# Patient Record
Sex: MALE | Race: WHITE | NOT HISPANIC OR LATINO | URBAN - NONMETROPOLITAN AREA
[De-identification: names, ages, dates, MRNs, and addresses within clinical notes are randomized per-mention and may not be internally consistent; named-entity substitution may affect disease eponyms.]

---

## 2023-03-03 NOTE — PROGRESS NOTES
"  Assessment & Plan     Opioid use disorder  Nirav is here to establish care.  He has been sober now for 3 years on Suboxone.  He takes 112 mg film under his tongue daily.  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  His life he states is so much better.  He has gained nearly 100 pounds \"\" in the last year.  For that reason you really need to get him back and get some lab work done to check his hep C status which he will do again in a month.  He is with his significant other who is also been sober the same amount of time  - SUBOXONE 12-3 MG FILM per film; Place 1 Film under the tongue daily    Establishing care with new doctor, encounter for  We will be happy to accept Nirav in our practice.  He has not officially established but we have seen him on and off with his partner  1. Opioid use disorder       Dental disease  All of next teeth are really severely decayed.  This is from his long-term heroin and other drug use.  At this point were going to make referral to have them all removed.      Ordering of each unique test  Prescription drug management  15 minutes spent on the date of the encounter doing chart review, history and exam, documentation and further activities per the note       See Patient Instructions    No follow-ups on file.    ZOHAIB Baer  [facility] - NEFTALISAHARA Grullon is a 32 year old, presenting for the following health issues:  Establish Care      HPI   Establish care   For Suboxone and general health. .     Concern - dental disease   Onset: she is going to be given referral. Getting teeth pulled and fixed.   Description: due to drug use has been sober 4 years.   Intensity: moderate  Progression of Symptoms:  improving  Accompanying Signs & Symptoms: facial pain   Previous history of similar problem: yes   Precipitating factors:        Worsened by: nothing   Alleviating factors:        Improved by: nothing   Therapies " tried and outcome:  none         Currently taking 9 mg of Buprenorphine/Naloxone daily.  - getting off the street     Status since last visit:    Since last visit patient has been: doing well.     Intensity:     There has been: no craving.      Suboxone Dose: adequate.      When did you take your last dose? Today  Progression of Symptoms:     Cues to use and relapse None - last use - April 2020    Recovery program has been:  New to us today.  Accompanying Signs & Symptoms:    Side Effects: none.    Sobriety:     Status: no use since last visit.      Drug Screen: patient willing but screen unnecessary.  Lab closed today.  Will update next visit  Precipitating factors:    Triggers have been: people, places.   Alleviating factors:    Contact with sponsor has been: no sponsor.     Family and support system has been: helpful.   Other Therapies Tried :     Patient has been going to recovery meetings:regularly.  Goes to [employer] regularly     Other medical concerns: No    Any ED visits? No     PDMP Reviewed? Yes, 3.6.23, as expected.  No findings.      Would like to get his own script.  History of severe OUD       Review of Systems   CONSTITUTIONAL: NEGATIVE for fever, chills, change in weight  ENT/MOUTH: NEGATIVE for ear, mouth and throat problems  RESP: NEGATIVE for significant cough or SOB  CV: NEGATIVE for chest pain, palpitations or peripheral edema      Objective    /65 (BP Location: Left arm, Patient Position: Sitting, Cuff Size: Adult Large)   Pulse 81   Temp 97.8  F (36.6  C) (Tympanic)   Resp 18   Wt 103.4 kg (228 lb)   SpO2 91%   There is no height or weight on file to calculate BMI.  Physical Exam   GENERAL: healthy, alert and no distress  EYES: Eyes grossly normal to inspection, PERRL and conjunctivae and sclerae normal  HENT: ear canals and TM's normal, nose and mouth without ulcers or lesions  NECK: no adenopathy, no asymmetry, masses, or scars and thyroid normal to palpation  RESP: lungs clear to  auscultation - no rales, rhonchi or wheezes  CV: regular rate and rhythm, normal S1 S2, no S3 or S4, no murmur, click or rub, no peripheral edema and peripheral pulses strong  ABDOMEN: soft, nontender, no hepatosplenomegaly, no masses and bowel sounds normal  MS: no gross musculoskeletal defects noted, no edema    No results found for any previous visit.

## 2023-03-06 ENCOUNTER — OFFICE VISIT (OUTPATIENT)
Dept: FAMILY MEDICINE | Facility: OTHER | Age: 33
End: 2023-03-06
Attending: PHYSICIAN ASSISTANT
Payer: COMMERCIAL

## 2023-03-06 ENCOUNTER — PATIENT OUTREACH (OUTPATIENT)
Dept: CARE COORDINATION | Facility: OTHER | Age: 33
End: 2023-03-06

## 2023-03-06 VITALS
DIASTOLIC BLOOD PRESSURE: 65 MMHG | TEMPERATURE: 97.8 F | HEART RATE: 81 BPM | WEIGHT: 228 LBS | OXYGEN SATURATION: 91 % | RESPIRATION RATE: 18 BRPM | SYSTOLIC BLOOD PRESSURE: 115 MMHG

## 2023-03-06 DIAGNOSIS — K08.9 DENTAL DISEASE: ICD-10-CM

## 2023-03-06 DIAGNOSIS — F11.90 OPIOID USE DISORDER: Primary | ICD-10-CM

## 2023-03-06 DIAGNOSIS — Z76.89 ESTABLISHING CARE WITH NEW DOCTOR, ENCOUNTER FOR: ICD-10-CM

## 2023-03-06 PROCEDURE — G0463 HOSPITAL OUTPT CLINIC VISIT: HCPCS

## 2023-03-06 PROCEDURE — 99203 OFFICE O/P NEW LOW 30 MIN: CPT | Performed by: PHYSICIAN ASSISTANT

## 2023-03-06 RX ORDER — BUPRENORPHINE HYDROCHLORIDE, NALOXONE HYDROCHLORIDE 12; 3 MG/1; MG/1
1 FILM, SOLUBLE BUCCAL; SUBLINGUAL DAILY
Qty: 30 EACH | Refills: 1 | Status: SHIPPED | OUTPATIENT
Start: 2023-03-06 | End: 2023-06-08

## 2023-03-06 SDOH — ECONOMIC STABILITY: FOOD INSECURITY: WITHIN THE PAST 12 MONTHS, YOU WORRIED THAT YOUR FOOD WOULD RUN OUT BEFORE YOU GOT MONEY TO BUY MORE.: NEVER TRUE

## 2023-03-06 SDOH — ECONOMIC STABILITY: FOOD INSECURITY: WITHIN THE PAST 12 MONTHS, THE FOOD YOU BOUGHT JUST DIDN'T LAST AND YOU DIDN'T HAVE MONEY TO GET MORE.: NEVER TRUE

## 2023-03-06 SDOH — ECONOMIC STABILITY: TRANSPORTATION INSECURITY
IN THE PAST 12 MONTHS, HAS LACK OF TRANSPORTATION KEPT YOU FROM MEETINGS, WORK, OR FROM GETTING THINGS NEEDED FOR DAILY LIVING?: NO

## 2023-03-06 SDOH — ECONOMIC STABILITY: TRANSPORTATION INSECURITY
IN THE PAST 12 MONTHS, HAS THE LACK OF TRANSPORTATION KEPT YOU FROM MEDICAL APPOINTMENTS OR FROM GETTING MEDICATIONS?: NO

## 2023-03-06 SDOH — ECONOMIC STABILITY: INCOME INSECURITY: IN THE LAST 12 MONTHS, WAS THERE A TIME WHEN YOU WERE NOT ABLE TO PAY THE MORTGAGE OR RENT ON TIME?: NO

## 2023-03-06 ASSESSMENT — ANXIETY QUESTIONNAIRES
4. TROUBLE RELAXING: NOT AT ALL
6. BECOMING EASILY ANNOYED OR IRRITABLE: MORE THAN HALF THE DAYS
3. WORRYING TOO MUCH ABOUT DIFFERENT THINGS: NOT AT ALL
GAD7 TOTAL SCORE: 5
IF YOU CHECKED OFF ANY PROBLEMS ON THIS QUESTIONNAIRE, HOW DIFFICULT HAVE THESE PROBLEMS MADE IT FOR YOU TO DO YOUR WORK, TAKE CARE OF THINGS AT HOME, OR GET ALONG WITH OTHER PEOPLE: NOT DIFFICULT AT ALL
GAD7 TOTAL SCORE: 5
2. NOT BEING ABLE TO STOP OR CONTROL WORRYING: SEVERAL DAYS
7. FEELING AFRAID AS IF SOMETHING AWFUL MIGHT HAPPEN: NOT AT ALL
1. FEELING NERVOUS, ANXIOUS, OR ON EDGE: MORE THAN HALF THE DAYS
5. BEING SO RESTLESS THAT IT IS HARD TO SIT STILL: NOT AT ALL

## 2023-03-06 ASSESSMENT — PATIENT HEALTH QUESTIONNAIRE - PHQ9: SUM OF ALL RESPONSES TO PHQ QUESTIONS 1-9: 10

## 2023-03-06 ASSESSMENT — PAIN SCALES - GENERAL: PAINLEVEL: NO PAIN (0)

## 2023-03-06 ASSESSMENT — SOCIAL DETERMINANTS OF HEALTH (SDOH): HOW HARD IS IT FOR YOU TO PAY FOR THE VERY BASICS LIKE FOOD, HOUSING, MEDICAL CARE, AND HEATING?: NOT HARD AT ALL

## 2023-03-06 ASSESSMENT — ACTIVITIES OF DAILY LIVING (ADL): DEPENDENT_IADLS:: INDEPENDENT

## 2023-03-06 NOTE — PROGRESS NOTES
Clinic Care Coordination Contact    Clinic Care Coordination Contact  OUTREACH    Referral Information:  Referral Source: Self-patient/Caregiver    Primary Diagnosis: CD    Chief Complaint   Patient presents with     Clinic Care Coordination - Face To Face        Universal Utilization: Nirav new to us today.  Has been taking Suboxone off the street for the last 3 years.  Would like to est care and get his own prescription    Clinic Utilization  Difficulty keeping appointments:: No  Compliance Concerns: No  No-Show Concerns: No  No PCP office visit in Past Year: No  Utilization    Hospital Admissions  0             ED Visits  0             No Show Count (past year)  0                Current as of: 3/4/2023  1:05 AM              Clinical Concerns:  Current Medical Concerns:  None    Current Behavioral Concerns: None    Education Provided to patient: Suboxone program went over in detail with pt.  Informed consent and treatment agreement signed.  Electronic communication form signed.  Narcan education provided.  Dicussed risks/benefits.  Risks of benzos/ETOH use discussed.       Pain  Pain (GOAL):: No  Health Maintenance Reviewed: Due/Overdue Est care this date - Will continue to work on this  Clinical Pathway: None    Medication Management:  Medication review status: Medications reviewed.  Changes noted per patient report.       Functional Status:  Dependent ADLs:: Independent  Dependent IADLs:: Independent  Bed or wheelchair confined:: No  Mobility Status: Independent    Living Situation:  Current living arrangement:: I live in a private home with spouse  Type of residence:: Apartment    Lifestyle & Psychosocial Needs:    Social Determinants of Health     Tobacco Use: Low Risk      Smoking Tobacco Use: Never     Smokeless Tobacco Use: Never     Passive Exposure: Not on file   Alcohol Use: Not on file   Financial Resource Strain: Low Risk      Difficulty of Paying Living Expenses: Not hard at all   Food Insecurity: No  Food Insecurity     Worried About Running Out of Food in the Last Year: Never true     Ran Out of Food in the Last Year: Never true   Transportation Needs: No Transportation Needs     Lack of Transportation (Medical): No     Lack of Transportation (Non-Medical): No   Physical Activity: Not on file   Stress: Not on file   Social Connections: Not on file   Intimate Partner Violence: Not on file   Depression: Not at risk     PHQ-2 Score: 2   Housing Stability: Low Risk      Unable to Pay for Housing in the Last Year: No     Number of Places Lived in the Last Year: 1     Unstable Housing in the Last Year: No     Diet:: Regular  Inadequate nutrition (GOAL):: No  Tube Feeding: No  Inadequate activity/exercise (GOAL):: No  Significant changes in sleep pattern (GOAL): No  Transportation means:: Regular car     Evangelical or spiritual beliefs that impact treatment:: No  Mental health DX:: No  Mental health management concern (GOAL):: No  Chemical Dependency Status: Past Concern  Informal Support system:: Children, Spouse, Family           Resources and Interventions:  Current Resources:      Community Resources: None  Supplies Currently Used at Home: None  Equipment Currently Used at Home: none  Employment Status: employed full-time       Referrals Placed: None    The patient consented via Written consent to have contact information and resources sent via text in an unencrypted manner.     Care Plan:      Patient/Caregiver understanding: Yes    Outreach Frequency: monthly  Future Appointments              In 1 month Charlotte Chaudhary, PA [facility] - Alesia Small          Plan: Will start Suboxone 9-12mg daily.  Follow up 2 months - will update lab work at next visit.  He is agreeable with this.  Reach out sooner with concerns.      Did have him sign electronic communication form and informed consent and treatment agreement this date.  Sent down to scanning.      Laura Peralta RN-BSN, [facility]  Coordinator  [phone] opt. #3

## 2023-03-06 NOTE — LETTER
Informed  Consent  and  Agreement  for  Buprenorphine/Naloxone Treatment  Of Opioid Dependence   Mitchel Rosalesll  [phone]         March 6, 2023        Buprenorphine/naloxone can control symptoms of withdrawal and can decrease cravings for other opioids,  We recommend not trying to stop this medication on your own, most people who stop on their own have a relapse of their drug use.      This  agreement  provides important information  on the potential benefits and  risks of opioid medications and shows that both  you and your provider agree on a care plan so  that opioid medications are used in a way that is  safe and effective in treating your withdrawal symptoms and opioid dependence.  This agreement is reviewed and signed by all patients in our practice who  receive Buprenorphine/Naloxone (Suboxone/Subutex).   There are other treatment options for opioid use disorder, such as Methadone and Vivitrol, which were discussed this date.          Expected  Benefits or  Goals of Buprenorphine Treatment     Decreased craving     Improved  ability to engage  in work, social, recreational  and/or physical activities     Improved  quality of life           Potential Risks or Side Effects of Buprenorphine Treatment    Overdose Taking more than the prescribed amount of medication or using with alcohol or other drugs can cause you to stop breathing, resulting in coma, brain damage, or even death.    Risk of overdose to other family members, children are at high risk of being harmed by buprenorphine    Physical side effects May include mood changes, drowsiness, nausea, constipation, urination difficulties, depressed breathing, itching, bone thinning and sexual difficulties, such as lowering male hormone in men and stopping menstrual periods in women.    Withdrawal Buprenorphine/naloxone can bring on severe withdrawal if taken with other opioids.   Physical dependence Suddenly stopping buprenorphine may lead to withdrawal  symptoms including abdominal cramping, pain, diarrhea, sweating, anxiety, irritability and aching.   Hyperalgesia The use of opioids can increase the experience of pain. If this happens, it may require change or discontinuation of medication.    Sleep apnea  Sleep apnea (periods of not breathing while asleep) may be caused or worsened by opioids.   Risk to unborn child Risks to unborn children may include: physical dependence at birth, possible alterations in pain perception, possible increased risk for addiction later in life, among others. Tell your provider if you are or intend to become pregnant.  Do NOT stop buprenorphine if you become pregnant without discussing with your provider.    Victimization There is a risk that you or someone in your household may be the victim of theft, deceit, assault or abuse by people trying to take your medications to misuse them.         Responsibilities  in Buprenorphine/naloxone Therapy  of Opioid Addiction   Your provider's responsibilities:   Listening carefully to your concerns, treating you with care and with due respect, and making clinical decisions based on what he/she believes is in your best interest.    Your responsibilities:   In order to maximize the potential benefits of buprenorphine and to minimize the potential risks, it is important that you accept the following responsibilities.    We are a primary care clinic, not an addiction clinic.    In signing this agreement, you agree to:  1. Use your buprenorphine medications as prescribed for the purpose of treating opioid addiction/opioid use disorder.  2. BE HONEST! We will work with you to help with your opioid dependence if you are upfront with us.  3. Be on time for appointments. Frequent late or missed appointments will result in termination from our program.  4. Be respectful and considerate to all staff and providers at our clinic.  Rude or aggressive behavior to staff including providers, nursing staff, front  desk and any others will result in termination from the program.  5. Avoid excessive alcohol or other dangerous recreational drug use, especially Benzodiazepines (like Xanax/alprozolam, Ativan/lorazepam, Valium/diazepam) while being prescribed buprenorphine. Evidence of this occurring may result in referral for consultation or higher level of care and possible termination from our program.  6. Not share, sell, trade or in any way provide your medications to others.  Evidence of this occurring may result in termination from our buprenorphine program.  7. Not receive controlled substances from other clinics without discussion with your buprenorphine physician.  Evidence of this occurring may result in termination from our buprenorphine program.  8. Starting or increasing dosage may result in difficulty concentrating; so we would recommend having a  to bring you to appointments where this may happen   9. If opioids are prescribed unexpectedly by another office (for example due to an accident or dental procedure), inform this office within 24 hours and bring documentation of the visit.  10. Have urine/blood drug tests on a random basis and as requested by your provider. Be ready for this at each appointment.  11. Bring your medication to the clinic when requested.  12. Store your medication in a secure location away from children.  13. Participate in other chemical dependency treatments or other forms of care agreed to with your provider.  You are expected to keep these appointments and follow your care plan. Buprenorphine alone is not enough to overcome addiction/dependence; you are required to have a care plan beyond just Buprenorphine.  14. Permit this practice to communicate with other care providers and/or your significant  others as needed to assure buprenorphine is being used appropriately and is beneficial to  your health and well-being.  15.  Plan ahead for refills. You are responsible for planning your  appointments to get refills on time. If you cannot schedule with your primary provider, it is your responsibility to schedule with another buprenorphine provider.  If you are restricted to just on provider then arrangements must be made in advance if you need refills when your provider is not available.  16. No phone refills - you must be seen in clinic by one of our physicians that prescribe buprenorphine.  17. Agree to purchase all buprenorphine/naloxone, and any other medications related to my treatment from one pharmacy.   18. No early refills. If you lose your meds, run out early, or have your meds stolen, we will not give early refills. We CAN, however, help with symptoms of withdrawal. Call us if you have any trouble, and we will try to help. We recommend a system to help lock up meds.  19.  I understand that my clinic can track controlled substance prescriptions from other providers through a central database (prescription monitoring program).  20  To tell my clinic right away if I become pregnant or have a new medical problem treated at another clinic.    Your medications may be continued if they assist you in maintaining sobriety, help you engage in valued activities, and/or enhance your quality of life and you follow the above responsibilities.  Your medications may be discontinued if your goals for treatment are not met, if you experience negative effects from using them, or if you do not follow this agreement.    I have reviewed this document and have been given the opportunity to have any questions  answered. I understand the possible benefits and risks of buprenorphine medications and I accept the  responsibilities described above.    __________________________________        ____________________________________  Patient     Date          ZOHAIB Baer                     Date

## 2023-04-28 ENCOUNTER — PATIENT OUTREACH (OUTPATIENT)
Dept: CARE COORDINATION | Facility: OTHER | Age: 33
End: 2023-04-28

## 2023-04-28 NOTE — PROGRESS NOTES
Clinic Care Coordination Contact  Care Team Conversations    CC sent Nirav a text message this date reminding him of his appointment on Monday, May 1, and to arrive at 4:15PM.  Informed him that he will have blood work completed at this time as well.      Laura Peralta RN-BSN, [facility] Care Coordinator  [phone] opt. #3

## 2023-05-01 ENCOUNTER — PATIENT OUTREACH (OUTPATIENT)
Dept: CARE COORDINATION | Facility: OTHER | Age: 33
End: 2023-05-01

## 2023-05-01 NOTE — PROGRESS NOTES
Clinic Care Coordination Contact  Care Team Conversations    Nirav was a no show for his appointment this date with Charlotte Chaudhary.  CC sent him a text message.  Will wait to hear back to get him rescheduled.    Laura Peralta RN-BSN, [facility] Care Coordinator  [phone] opt. #3

## 2023-06-05 DIAGNOSIS — F11.90 OPIOID USE DISORDER: ICD-10-CM

## 2023-06-06 ENCOUNTER — PATIENT OUTREACH (OUTPATIENT)
Dept: CARE COORDINATION | Facility: OTHER | Age: 33
End: 2023-06-06

## 2023-06-06 NOTE — PROGRESS NOTES
Clinic Care Coordination Contact  Care Team Conversations    CC sent Nirav a text message this date requesting that he let CC know when he is available to come in for an appointment with Charlotte Chaudhary.  Nirav missed his last appointment.  Will wait to hear back.    Laura Peralta RN-BSN, [facility] Care Coordinator  [phone] opt. #3

## 2023-06-08 NOTE — TELEPHONE ENCOUNTER
Suboxone      Last Written Prescription Date:  4.24.23  Last Fill Quantity: #30,   # refills: 0  Last Office Visit: 3.6.23  Future Office visit:       Routing refill request to provider for review/approval because:  Drug not on the G, P or [facility] refill protocol or controlled substance

## 2023-06-09 RX ORDER — BUPRENORPHINE HYDROCHLORIDE, NALOXONE HYDROCHLORIDE 12; 3 MG/1; MG/1
1 FILM, SOLUBLE BUCCAL; SUBLINGUAL DAILY
Qty: 20 EACH | Refills: 0 | Status: SHIPPED | OUTPATIENT
Start: 2023-06-09 | End: 2023-07-03

## 2023-07-03 DIAGNOSIS — F11.90 OPIOID USE DISORDER: ICD-10-CM

## 2023-07-03 RX ORDER — BUPRENORPHINE HYDROCHLORIDE, NALOXONE HYDROCHLORIDE 12; 3 MG/1; MG/1
1 FILM, SOLUBLE BUCCAL; SUBLINGUAL DAILY
Qty: 30 EACH | Refills: 0 | Status: SHIPPED | OUTPATIENT
Start: 2023-07-03 | End: 2023-07-12

## 2023-07-06 RX ORDER — BUPRENORPHINE HYDROCHLORIDE, NALOXONE HYDROCHLORIDE 12; 3 MG/1; MG/1
1 FILM, SOLUBLE BUCCAL; SUBLINGUAL DAILY
Refills: 0 | OUTPATIENT
Start: 2023-07-06

## 2023-07-11 ENCOUNTER — PATIENT OUTREACH (OUTPATIENT)
Dept: CARE COORDINATION | Facility: OTHER | Age: 33
End: 2023-07-11

## 2023-07-11 NOTE — PROGRESS NOTES
Assessment & Plan     Opioid use disorder  Refill is given . His labs need to be done. He is sober as well as his wife. Working in a [employer] community. This seems to help her.  See us back as recommended.    - Basic metabolic panel  - CBC with platelets  - Hepatic function panel  - Hepatitis B Surface Antibody  - Hepatitis B surface antigen  - Hepatitis C Screen Reflex to HCV RNA Quant and Genotype  - HIV Antigen Antibody Combo Cascade  - Urine Drugs of Abuse Screen Panel 13  - SUBOXONE 12-3 MG FILM per film; Place 1 Film under the tongue daily    Review of external notes as documented elsewhere in note  Ordering of each unique test  Prescription drug management  10  minutes spent by me on the date of the encounter doing chart review, history and exam, documentation and further activities per the note       See Patient Instructions    No follow-ups on file.    ZOHAIB Baer  [facility] - NEFTALISAHARA Grullon is a 32 year old, presenting for the following health issues:  Recheck Medication         No data to display              HPI       He is currently taking 12/3 mg of buprenorphine daily.   Last fill 7.8.23 #30.     Status Since Last Visit:    Have you used any opioids since your last visit?: no use since last visit    Do you feel that your dose of suboxone is too high or too low? Adequate    Have there been cravings for opioids? No     Any withdrawal symptoms? None      Any side effects from the medication? None    Any alcohol use? None    Any other recreational drug use? THC      Precipitating Factors:    Triggers have been: non-existent   Other Supports:    Do you attend counseling or meet with a therapist? No    Do you attend NA or AA meetings? No    Do you have/meet with a sponsor? No    Family and support systems have been: helpful    What other goals have you been working on (job, family, relationships, etc)? Still working at [employer].  Enjoying it.  Traveling a lot            3/6/2023     4:00 PM   PHQ-9 SCORE   PHQ-9 Total Score 10         3/6/2023     4:00 PM   AYSE-7 SCORE   Total Score 5     PDMP Review     None                  Review of Systems   Constitutional, HEENT, cardiovascular, pulmonary, gi and gu systems are negative, except as otherwise noted.      Objective    /70 (BP Location: Left arm, Patient Position: Sitting, Cuff Size: Adult Large)   Pulse 84   Temp 98.4  F (36.9  C) (Tympanic)   Resp 16   Wt 103.4 kg (228 lb)   SpO2 94%   There is no height or weight on file to calculate BMI.  Physical Exam   GENERAL: healthy, alert and no distress  NECK: no adenopathy, no asymmetry, masses, or scars and thyroid normal to palpation  RESP: lungs clear to auscultation - no rales, rhonchi or wheezes  CV: regular rate and rhythm, normal S1 S2, no S3 or S4, no murmur, click or rub, no peripheral edema and peripheral pulses strong  ABDOMEN: soft, nontender, no hepatosplenomegaly, no masses and bowel sounds normal  MS: no gross musculoskeletal defects noted, no edema  PSYCH: mentation appears normal, affect normal/bright    Results for orders placed or performed in visit on 07/12/23 (from the past 24 hour(s))   Basic metabolic panel   Result Value Ref Range    Sodium 140 136 - 145 mmol/L    Potassium 4.0 3.4 - 5.3 mmol/L    Chloride 102 98 - 107 mmol/L    Carbon Dioxide (CO2) 26 22 - 29 mmol/L    Anion Gap 12 7 - 15 mmol/L    Urea Nitrogen 12.5 6.0 - 20.0 mg/dL    Creatinine 0.75 0.67 - 1.17 mg/dL    Calcium 10.2 (H) 8.6 - 10.0 mg/dL    Glucose 97 70 - 99 mg/dL    GFR Estimate >90 >60 mL/min/1.73m2   CBC with platelets   Result Value Ref Range    WBC Count 8.3 4.0 - 11.0 10e3/uL    RBC Count 5.04 4.40 - 5.90 10e6/uL    Hemoglobin 14.0 13.3 - 17.7 g/dL    Hematocrit 43.0 40.0 - 53.0 %    MCV 85 78 - 100 fL    MCH 27.8 26.5 - 33.0 pg    MCHC 32.6 31.5 - 36.5 g/dL    RDW 13.6 10.0 - 15.0 %    Platelet Count 300 150 - 450 10e3/uL   Hepatic function panel   Result Value  Ref Range    Protein Total 8.0 6.4 - 8.3 g/dL    Albumin 4.5 3.5 - 5.2 g/dL    Bilirubin Total 0.2 <=1.2 mg/dL    Alkaline Phosphatase 95 40 - 129 U/L    AST 22 0 - 45 U/L    ALT 26 0 - 70 U/L    Bilirubin Direct <0.20 0.00 - 0.30 mg/dL   Urine Drugs of Abuse Screen Panel 13   Result Value Ref Range    Cannabinoids (74-hzw-9-carboxy-9-THC) Detected (A) Not Detected, Indeterminate    Phencyclidine Not Detected Not Detected, Indeterminate    Cocaine (Benzoylecgonine) Not Detected Not Detected, Indeterminate    Methamphetamine (d-Methamphetamine) Not Detected Not Detected, Indeterminate    Opiates (Morphine) Not Detected Not Detected, Indeterminate    Amphetamine (d-Amphetamine) Not Detected Not Detected, Indeterminate    Benzodiazepines (Nordiazepam) Not Detected Not Detected, Indeterminate    Tricyclic Antidepressants (Desipramine) Not Detected Not Detected, Indeterminate    Methadone Not Detected Not Detected, Indeterminate    Barbiturates (Butalbital) Not Detected Not Detected, Indeterminate    Oxycodone Not Detected Not Detected, Indeterminate    Propoxyphene (Norpropoxyphene) Not Detected Not Detected, Indeterminate    Buprenorphine Detected (A) Not Detected, Indeterminate

## 2023-07-11 NOTE — PROGRESS NOTES
Clinic Care Coordination Contact  Care Team Conversations    CC sent Nirav a text message this date reminding him of his appointment tomorrow and to arrive at 4:15PM.    Laura Peralta RN-BSN, [facility] Care Coordinator  [phone] opt. #3

## 2023-07-12 ENCOUNTER — APPOINTMENT (OUTPATIENT)
Dept: LAB | Facility: OTHER | Age: 33
End: 2023-07-12
Attending: PHYSICIAN ASSISTANT
Payer: COMMERCIAL

## 2023-07-12 ENCOUNTER — PATIENT OUTREACH (OUTPATIENT)
Dept: CARE COORDINATION | Facility: OTHER | Age: 33
End: 2023-07-12

## 2023-07-12 ENCOUNTER — OFFICE VISIT (OUTPATIENT)
Dept: FAMILY MEDICINE | Facility: OTHER | Age: 33
End: 2023-07-12
Attending: PHYSICIAN ASSISTANT
Payer: COMMERCIAL

## 2023-07-12 VITALS
OXYGEN SATURATION: 94 % | DIASTOLIC BLOOD PRESSURE: 70 MMHG | TEMPERATURE: 98.4 F | RESPIRATION RATE: 16 BRPM | WEIGHT: 228 LBS | HEART RATE: 84 BPM | SYSTOLIC BLOOD PRESSURE: 112 MMHG

## 2023-07-12 DIAGNOSIS — F11.90 OPIOID USE DISORDER: Primary | ICD-10-CM

## 2023-07-12 LAB
ALBUMIN SERPL BCG-MCNC: 4.5 G/DL (ref 3.5–5.2)
ALP SERPL-CCNC: 95 U/L (ref 40–129)
ALT SERPL W P-5'-P-CCNC: 26 U/L (ref 0–70)
AMPHETAMINES UR QL: NOT DETECTED
ANION GAP SERPL CALCULATED.3IONS-SCNC: 12 MMOL/L (ref 7–15)
AST SERPL W P-5'-P-CCNC: 22 U/L (ref 0–45)
BARBITURATES UR QL SCN: NOT DETECTED
BENZODIAZ UR QL SCN: NOT DETECTED
BILIRUB DIRECT SERPL-MCNC: <0.2 MG/DL (ref 0–0.3)
BILIRUB SERPL-MCNC: 0.2 MG/DL
BUN SERPL-MCNC: 12.5 MG/DL (ref 6–20)
BUPRENORPHINE UR QL: DETECTED
CALCIUM SERPL-MCNC: 10.2 MG/DL (ref 8.6–10)
CANNABINOIDS UR QL: DETECTED
CHLORIDE SERPL-SCNC: 102 MMOL/L (ref 98–107)
COCAINE UR QL SCN: NOT DETECTED
CREAT SERPL-MCNC: 0.75 MG/DL (ref 0.67–1.17)
D-METHAMPHET UR QL: NOT DETECTED
DEPRECATED HCO3 PLAS-SCNC: 26 MMOL/L (ref 22–29)
ERYTHROCYTE [DISTWIDTH] IN BLOOD BY AUTOMATED COUNT: 13.6 % (ref 10–15)
GFR SERPL CREATININE-BSD FRML MDRD: >90 ML/MIN/1.73M2
GLUCOSE SERPL-MCNC: 97 MG/DL (ref 70–99)
HCT VFR BLD AUTO: 43 % (ref 40–53)
HGB BLD-MCNC: 14 G/DL (ref 13.3–17.7)
MCH RBC QN AUTO: 27.8 PG (ref 26.5–33)
MCHC RBC AUTO-ENTMCNC: 32.6 G/DL (ref 31.5–36.5)
MCV RBC AUTO: 85 FL (ref 78–100)
METHADONE UR QL SCN: NOT DETECTED
OPIATES UR QL SCN: NOT DETECTED
OXYCODONE UR QL SCN: NOT DETECTED
PCP UR QL SCN: NOT DETECTED
PLATELET # BLD AUTO: 300 10E3/UL (ref 150–450)
POTASSIUM SERPL-SCNC: 4 MMOL/L (ref 3.4–5.3)
PROPOXYPH UR QL: NOT DETECTED
PROT SERPL-MCNC: 8 G/DL (ref 6.4–8.3)
RBC # BLD AUTO: 5.04 10E6/UL (ref 4.4–5.9)
SODIUM SERPL-SCNC: 140 MMOL/L (ref 136–145)
TRICYCLICS UR QL SCN: NOT DETECTED
WBC # BLD AUTO: 8.3 10E3/UL (ref 4–11)

## 2023-07-12 PROCEDURE — 82248 BILIRUBIN DIRECT: CPT | Mod: ZL | Performed by: PHYSICIAN ASSISTANT

## 2023-07-12 PROCEDURE — 86803 HEPATITIS C AB TEST: CPT | Mod: ZL | Performed by: PHYSICIAN ASSISTANT

## 2023-07-12 PROCEDURE — 87340 HEPATITIS B SURFACE AG IA: CPT | Mod: ZL | Performed by: PHYSICIAN ASSISTANT

## 2023-07-12 PROCEDURE — 85027 COMPLETE CBC AUTOMATED: CPT | Mod: ZL | Performed by: PHYSICIAN ASSISTANT

## 2023-07-12 PROCEDURE — 86706 HEP B SURFACE ANTIBODY: CPT | Mod: ZL | Performed by: PHYSICIAN ASSISTANT

## 2023-07-12 PROCEDURE — 80306 DRUG TEST PRSMV INSTRMNT: CPT | Mod: ZL | Performed by: PHYSICIAN ASSISTANT

## 2023-07-12 PROCEDURE — 99214 OFFICE O/P EST MOD 30 MIN: CPT | Performed by: PHYSICIAN ASSISTANT

## 2023-07-12 PROCEDURE — G0463 HOSPITAL OUTPT CLINIC VISIT: HCPCS

## 2023-07-12 PROCEDURE — 82310 ASSAY OF CALCIUM: CPT | Mod: ZL | Performed by: PHYSICIAN ASSISTANT

## 2023-07-12 PROCEDURE — 87389 HIV-1 AG W/HIV-1&-2 AB AG IA: CPT | Mod: ZL | Performed by: PHYSICIAN ASSISTANT

## 2023-07-12 PROCEDURE — 36415 COLL VENOUS BLD VENIPUNCTURE: CPT | Mod: ZL | Performed by: PHYSICIAN ASSISTANT

## 2023-07-12 RX ORDER — BUPRENORPHINE HYDROCHLORIDE, NALOXONE HYDROCHLORIDE 12; 3 MG/1; MG/1
1 FILM, SOLUBLE BUCCAL; SUBLINGUAL DAILY
Qty: 30 EACH | Refills: 1 | Status: SHIPPED | OUTPATIENT
Start: 2023-07-12 | End: 2023-10-04

## 2023-07-12 ASSESSMENT — PAIN SCALES - GENERAL: PAINLEVEL: NO PAIN (0)

## 2023-07-12 NOTE — TELEPHONE ENCOUNTER
Suboxone      Last Written Prescription Date:  6.9.23  Last Fill Quantity: #20,   # refills: 0  Last Office Visit: 3.6.23  Future Office visit:    Next 5 appointments (look out 90 days)    Jul 12, 2023  4:30 PM  (Arrive by 4:15 PM)  [facility] [place] [facility] [address]  [place] [address]  [phone]           Routing refill request to provider for review/approval because:  Drug not on the [facility], P or [facility] refill protocol or controlled substance     Yes

## 2023-07-12 NOTE — PROGRESS NOTES
Clinic Care Coordination Contact    Follow Up Progress Note      Assessment: CC attended office visit with pt, pt's spouse, and Charlotte Chaudhary this date.  Nirav is doing very well in recovery.  Suboxone dose is adequate and would like to continue on current dose.  Still working at [employer] - enjoying this.  Has been traveling a lot - spending time with family.  Updated blood work today.  No questions or concerns.      Care Gaps:    Health Maintenance Due   Topic Date Due     NICOTINE/TOBACCO CESSATION COUNSELING Q 1 YR  Never done     ADVANCE CARE PLANNING  Never done     HEPATITIS B IMMUNIZATION (1 of 3 - 3-dose series) Never done     COVID-19 Vaccine (1) Never done     HIV SCREENING  Never done     HEPATITIS C SCREENING  Never done     YEARLY PREVENTIVE VISIT  03/21/2019       Casa continue to work on these    Care Plans      Intervention/Education provided during outreach: Lab work updated today.  Will inform him of results when all results are back.       Outreach Frequency: 3 months      Plan:   No change in treatment plan at this time.  Care Coordinator will follow up in 12 weeks, sooner if he has concerns.    Laura Peralta RN-BSN, [facility] Care Coordinator  [phone] opt. #3

## 2023-07-13 LAB
HBV SURFACE AB SERPL IA-ACNC: 0 M[IU]/ML
HBV SURFACE AB SERPL IA-ACNC: NONREACTIVE M[IU]/ML
HBV SURFACE AG SERPL QL IA: NONREACTIVE
HCV AB SERPL QL IA: NONREACTIVE
HIV 1+2 AB+HIV1 P24 AG SERPL QL IA: NONREACTIVE

## 2023-07-17 NOTE — RESULT ENCOUNTER NOTE
[address] [address]    Phone:  [phone]    Fax:  [phone]       Thank You for choosing us for your health care visit. We are glad to serve you and happy to provide you with this summary of your visit. Please help us to ensure we have accurate records. If you find anything that needs to be changed, please let our staff know as soon as possible.          Your Demographic Information     Patient Name Sex     Pablo Dominguez Male 1952       Ethnic Group Patient Race    Not of  or  Origin White      Your Visit Details     Date & Time Provider Department    3/3/2017 1:45 PM Deejay Webb MD [facility]      Your Upcoming Appointment*(Max 10)     2017  8:00 AM CDT   Complete Physical Exam with Deejay Webb MD   [facility] ([facility])    [address] [place], [place] [address]   [phone]              Conditions Discussed Today or Order-Related Diagnoses        Comments    Erectile dysfunction of organic origin    -  Primary       Your Vitals Were     BP Pulse Height Weight SpO2 BMI    124/68 70 6' 1\" (1.854 m) 188 lb (85.3 kg) 99% 24.8 kg/m2    Smoking Status                   Former Smoker           Medications Prescribed or Re-Ordered Today     sildenafil (VIAGRA) 100 MG tablet    Sig - Route: Take 1 tablet by mouth as needed for Erectile Dysfunction. 1/2 tablet to 1 tablet daily as needed for ED - Oral    Class: Eprescribe    Pharmacy: [facility] #[facility] [address] Ph #: [phone]      Your Current Medications Are        Disp Refills Start End    sildenafil (VIAGRA) 100 MG tablet 3 tablet 0 3/3/2017     Sig - Route: Take 1 tablet by mouth as needed for Erectile Dysfunction. 1/2 tablet to 1 tablet daily as needed for ED - Oral    Class: Eprescribe    naproxen  No hep C, HIV, all other labs are good including liver  sodium (ALEVE) 220 MG tablet        Sig - Route: Take 220 mg by mouth 2 times daily as needed.  - Oral    Class: Historical Med    vitamin - therapeutic multivitamins w/minerals (CENTRUM SILVER,THERA-M) TABS        Sig - Route: Take 1 tablet by mouth daily. - Oral    Class: Historical Med      Allergies     Bactrim RASH      Immunizations History as of 3/3/2017     Name Date    HERPES ZOSTER SHINGLES 3/16/2015    Hepatitis B Adult 5/8/2001, 12/5/2000, 11/1/2000    Td:Adult type tetanus/diphtheria 11/2/1998    Tdap 2/7/2012      Problem List as of 3/3/2017     Routine general medical examination at a health care facility    Plantar fasciitis of right foot    Left knee pain    Bilateral leg edema    Erectile dysfunction of organic origin            Patient Instructions     None

## 2023-10-03 ENCOUNTER — PATIENT OUTREACH (OUTPATIENT)
Dept: CARE COORDINATION | Facility: OTHER | Age: 33
End: 2023-10-03

## 2023-10-03 NOTE — PROGRESS NOTES
Clinic Care Coordination Contact  Care Team Conversations    CC sent Nirav a text message this date reminding him of his appointment tomorrow and to arrive at 3:45PM.    Laura Peralta RN-BSN, [facility] Care Coordinator  [phone] opt. #3

## 2023-10-03 NOTE — PROGRESS NOTES
Assessment & Plan     Opioid use disorder  Staying sober and doing well.   - Urine Drugs of Abuse Screen Buprenorphine Urine Qualitative [id number], Ethanol Urine Qualitative TPO044, Methadone Urine Qualitative JUJ0008, Oxycodone Urine Qualitative RPH7688, Creatinine Urine Random OFM729; No  - SUBOXONE 12-3 MG FILM per film; Place 1 Film under the tongue daily    Prescription drug management  10  minutes spent by me on the date of the encounter doing chart review, history and exam, documentation and further activities per the note       See Patient Instructions    No follow-ups on file.    ZOHAIB Baer  [facility] [place] Grullon is a 32 year old, presenting for the following health issues:  Recheck Medication        10/4/2023     4:09 PM   Additional Questions   Roomed by Zina Farrar   Accompanied by Destiny         10/4/2023     4:09 PM   Patient Reported Additional Medications   Patient reports taking the following new medications none       HPI       He is currently taking 12/3 mg of buprenorphine 1 time daily.   Last fill 9.30.23 #30.    Status Since Last Visit:  Have you used any opioids since your last visit?: no use since last visit  Do you feel that your dose of suboxone is too high or too low? Adequate  Have there been cravings for opioids? No   Any withdrawal symptoms?  None     Any side effects from the medication?  None  Any alcohol use? None  Any other recreational drug use? THC    Precipitating Factors:  Triggers have been: non-existent   Other Supports:  Do you attend counseling or meet with a therapist? No - going to [employer]   Do you attend NA or AA meetings? No  Do you have/meet with a sponsor? No  Family and support systems have been: Helpful  What other goals have you been working on (job, family, relationships, etc)? 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  Has been working on the house  Going to [employer]  Working at [employer]  Spending a lot of time with family            3/6/2023     4:00 PM   PHQ-9 SCORE   PHQ-9 Total Score 10           3/6/2023     4:00 PM   AYSE-7 SCORE   Total Score 5     PDMP Review       None                    Review of Systems   Constitutional, HEENT, cardiovascular, pulmonary, gi and gu systems are negative, except as otherwise noted.      Objective    /71 (BP Location: Left arm, Patient Position: Sitting, Cuff Size: Adult Regular)   Pulse 92   Temp 98.6  F (37  C) (Tympanic)   Wt 98.1 kg (216 lb 4.8 oz)   SpO2 94%   There is no height or weight on file to calculate BMI.  Physical Exam   GENERAL: healthy, alert and no distress  EYES: Eyes grossly normal to inspection, PERRL and conjunctivae and sclerae normal  HENT: ear canals and TM's normal, nose and mouth without ulcers or lesions  NECK: no adenopathy, no asymmetry, masses, or scars and thyroid normal to palpation  RESP: lungs clear to auscultation - no rales, rhonchi or wheezes  CV: regular rate and rhythm, normal S1 S2, no S3 or S4, no murmur, click or rub, no peripheral edema and peripheral pulses strong  ABDOMEN: soft, nontender, no hepatosplenomegaly, no masses and bowel sounds normal  MS: no gross musculoskeletal defects noted, no edema    Results for orders placed or performed in visit on 10/04/23 (from the past 24 hour(s))   Urine Drugs of Abuse Screen Buprenorphine Urine Qualitative [id number], Ethanol Urine Qualitative FDL549, Methadone Urine Qualitative VNM1886, Oxycodone Urine Qualitative FWQ0645, Creatinine Urine Random VAL152; No    Narrative    The following orders were created for panel order Urine Drugs of Abuse Screen Buprenorphine Urine Qualitative [id number], Ethanol Urine Qualitative CBX974, Methadone Urine Qualitative OHV9028, Oxycodone Urine Qualitative EVI0903, Creatinine Urine Random ZEX023; No.  Procedure                               Abnormality         Status                     ---------                               -----------         ------                      Drug Abuse Screen Qual U...[[id number]]  Abnormal            Final result               Buprenorphine Urine, Jean...[[id number]]  Abnormal            Final result               Ethanol urine[[id number]]                Normal              Final result               Methadone Qual Urine[[id number]]         Normal              Final result               Oxycodone Urine, Qualita...[[id number]]  Normal              Final result               Creatinine random urine[[id number]]                          Final result                 Please view results for these tests on the individual orders.   Drug Abuse Screen Qual Urine   Result Value Ref Range    Amphetamines Urine Screen Negative Screen Negative    Barbituates Urine Screen Negative Screen Negative    Benzodiazepine Urine Screen Negative Screen Negative    Cannabinoids Urine Screen Positive (A) Screen Negative    Cocaine Urine Screen Negative Screen Negative    Fentanyl Qual Urine Screen Negative Screen Negative    Opiates Urine Screen Negative Screen Negative    PCP Urine Screen Negative Screen Negative   Buprenorphine Urine, Qualitative   Result Value Ref Range    Buprenorphine Qual Urine Screen Positive (A) Screen Negative   Ethanol urine   Result Value Ref Range    Ethanol Urine Screen Negative Screen Negative   Methadone Qual Urine   Result Value Ref Range    Methadone Urine Screen Negative Screen Negative   Oxycodone Urine, Qualitative   Result Value Ref Range    Oxycodone Urine Screen Negative Screen Negative   Creatinine random urine   Result Value Ref Range    Creatinine Urine mg/dL 185.1 mg/dL

## 2023-10-04 ENCOUNTER — PATIENT OUTREACH (OUTPATIENT)
Dept: CARE COORDINATION | Facility: OTHER | Age: 33
End: 2023-10-04

## 2023-10-04 ENCOUNTER — APPOINTMENT (OUTPATIENT)
Dept: LAB | Facility: OTHER | Age: 33
End: 2023-10-04
Attending: PHYSICIAN ASSISTANT
Payer: COMMERCIAL

## 2023-10-04 ENCOUNTER — OFFICE VISIT (OUTPATIENT)
Dept: FAMILY MEDICINE | Facility: OTHER | Age: 33
End: 2023-10-04
Attending: PHYSICIAN ASSISTANT
Payer: COMMERCIAL

## 2023-10-04 VITALS
HEART RATE: 92 BPM | TEMPERATURE: 98.6 F | DIASTOLIC BLOOD PRESSURE: 71 MMHG | OXYGEN SATURATION: 94 % | SYSTOLIC BLOOD PRESSURE: 128 MMHG | WEIGHT: 216.3 LBS

## 2023-10-04 DIAGNOSIS — F11.90 OPIOID USE DISORDER: Primary | ICD-10-CM

## 2023-10-04 LAB
AMPHETAMINES UR QL SCN: ABNORMAL
BARBITURATES UR QL SCN: ABNORMAL
BENZODIAZ UR QL SCN: ABNORMAL
BUPRENORPHINE UR QL: ABNORMAL
BZE UR QL SCN: ABNORMAL
CANNABINOIDS UR QL SCN: ABNORMAL
CREAT UR-MCNC: 185.1 MG/DL
ETHANOL UR QL SCN: NORMAL
FENTANYL UR QL: ABNORMAL
METHADONE UR QL SCN: NORMAL
OPIATES UR QL SCN: ABNORMAL
OXYCODONE UR QL: NORMAL
PCP QUAL URINE (ROCHE): ABNORMAL

## 2023-10-04 PROCEDURE — 80307 DRUG TEST PRSMV CHEM ANLYZR: CPT | Mod: ZL | Performed by: PHYSICIAN ASSISTANT

## 2023-10-04 PROCEDURE — 82570 ASSAY OF URINE CREATININE: CPT | Mod: ZL,XU | Performed by: PHYSICIAN ASSISTANT

## 2023-10-04 PROCEDURE — G0463 HOSPITAL OUTPT CLINIC VISIT: HCPCS

## 2023-10-04 PROCEDURE — 99213 OFFICE O/P EST LOW 20 MIN: CPT | Performed by: PHYSICIAN ASSISTANT

## 2023-10-04 RX ORDER — KETOCONAZOLE 20 MG/ML
SHAMPOO TOPICAL DAILY PRN
Qty: 240 ML | Refills: 4 | Status: SHIPPED | OUTPATIENT
Start: 2023-10-04

## 2023-10-04 RX ORDER — BUPRENORPHINE HYDROCHLORIDE, NALOXONE HYDROCHLORIDE 12; 3 MG/1; MG/1
1 FILM, SOLUBLE BUCCAL; SUBLINGUAL DAILY
Qty: 30 EACH | Refills: 1 | Status: SHIPPED | OUTPATIENT
Start: 2023-10-04 | End: 2024-01-26

## 2023-10-04 NOTE — PROGRESS NOTES
Clinic Care Coordination Contact  Follow Up Progress Note      Assessment: CC attended office visit with pt, pt's wife, and Charlotte Chaudhary this date.  Nirav is doing very well in recovery.  Suboxone dose is adequate and would like to continue on current dose.  Still working at [employer].  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  Has been busy working on the house.  Continues to go to [employer].  Spending time with family.      Care Gaps:    Health Maintenance Due   Topic Date Due    NICOTINE/TOBACCO CESSATION COUNSELING Q 1 YR  Never done    ADVANCE CARE PLANNING  Never done    HEPATITIS B IMMUNIZATION (1 of 3 - 3-dose series) Never done    COVID-19 Vaccine (1) Never done    YEARLY PREVENTIVE VISIT  03/21/2019    INFLUENZA VACCINE (1) Never done       Will continue to work on these    Care Plans      Intervention/Education provided during outreach: Continue meds as prescribed.  Continue going to [employer].       Outreach Frequency: 3 months        Plan:   No change in treatment plan at this time.      Care Coordinator will follow up in 12 weeks, sooner if he has concerns.    Laura Peralta RN-BSN, [facility] Care Coordinator  [phone] opt. #3

## 2023-12-27 ENCOUNTER — PATIENT OUTREACH (OUTPATIENT)
Dept: CARE COORDINATION | Facility: OTHER | Age: 33
End: 2023-12-27

## 2023-12-27 NOTE — PROGRESS NOTES
Clinic Care Coordination Contact  Care Team Conversations    Charlotte Neena it out this date.  CC sent Nirav a text message informing him of this and that today's appointment will need to be rescheduled.   Rescheduled him for January 24, arrive at 3:15PM.      Laura Peralta RN-BSN, [facility] Care Coordinator  [phone] opt. #3

## 2024-01-23 ENCOUNTER — PATIENT OUTREACH (OUTPATIENT)
Dept: CARE COORDINATION | Facility: OTHER | Age: 34
End: 2024-01-23

## 2024-01-23 NOTE — PROGRESS NOTES
Clinic Care Coordination Contact  Care Team Conversations    CC sent Nirav a text message this date reminding him of his appointment tomorrow and to arrive at 3:15PM.    Laura Peralta RN-BSN, [facility] Care Coordinator  [phone]

## 2024-01-24 ENCOUNTER — PATIENT OUTREACH (OUTPATIENT)
Dept: CARE COORDINATION | Facility: OTHER | Age: 34
End: 2024-01-24

## 2024-01-24 NOTE — PROGRESS NOTES
Clinic Care Coordination Contact  Care Team Conversations    Received message from Nirav stating that he is unable to make it to his appointment today.  Reports he is currently without insurance.  CC encouraged him to call the [facility] to work on getting his insurance reactivated.  He stated that he will do this.  CC requested that he reach out to reschedule when his insurance is active again.  He agreed to this.    Will wait for response.    Laura Peralta RN-BSN, [facility] Care Coordinator  [phone]

## 2024-01-26 DIAGNOSIS — F11.90 OPIOID USE DISORDER: ICD-10-CM

## 2024-01-26 RX ORDER — BUPRENORPHINE HYDROCHLORIDE, NALOXONE HYDROCHLORIDE 12; 3 MG/1; MG/1
1 FILM, SOLUBLE BUCCAL; SUBLINGUAL DAILY
Qty: 30 EACH | Refills: 0 | Status: SHIPPED | OUTPATIENT
Start: 2024-01-26 | End: 2024-03-07

## 2024-01-26 NOTE — TELEPHONE ENCOUNTER
Suboxone    - insurance runs out on 1/31/24 - thought it was already inactive.   Last Written Prescription Date:  12.15.23  Last Fill Quantity: #30,   # refills: 0  Last Office Visit: 10.4.23  Future Office visit:       Routing refill request to provider for review/approval because:  Drug not on the FM, P or [facility] refill protocol or controlled substance

## 2024-03-07 DIAGNOSIS — F11.90 OPIOID USE DISORDER: ICD-10-CM

## 2024-03-07 RX ORDER — BUPRENORPHINE HYDROCHLORIDE, NALOXONE HYDROCHLORIDE 12; 3 MG/1; MG/1
1 FILM, SOLUBLE BUCCAL; SUBLINGUAL DAILY
Qty: 30 EACH | Refills: 0 | Status: SHIPPED | OUTPATIENT
Start: 2024-03-07 | End: 2024-05-02

## 2024-03-07 NOTE — TELEPHONE ENCOUNTER
Suboxone   - waiting to hear [place] from the county - insurance info has been submitted for renewal.   Last Written Prescription Date:  1.26.24  Last Fill Quantity: #30,   # refills: 0  Last Office Visit: 10.4.23  Future Office visit:       Routing refill request to provider for review/approval because:  Drug not on the [facility], P or [facility] refill protocol or controlled substance

## 2024-05-02 DIAGNOSIS — F11.90 OPIOID USE DISORDER: ICD-10-CM

## 2024-05-02 RX ORDER — BUPRENORPHINE AND NALOXONE 12; 3 MG/1; MG/1
1 FILM, SOLUBLE BUCCAL; SUBLINGUAL DAILY
Qty: 30 EACH | Refills: 0 | Status: SHIPPED | OUTPATIENT
Start: 2024-05-02

## 2024-05-02 NOTE — TELEPHONE ENCOUNTER
Suboxone - still working on insurance-  should be active next month      Last Written Prescription Date:  3.27.24  Last Fill Quantity: #30,   # refills: 0  Last Office Visit: 10.4.23  Future Office visit:       Routing refill request to provider for review/approval because:  Drug not on the [facility], [facility] or [facility] refill protocol or controlled substance

## 2024-05-06 RX ORDER — BUPRENORPHINE AND NALOXONE 8; 2 MG/1; MG/1
1.5 FILM, SOLUBLE BUCCAL; SUBLINGUAL DAILY
Qty: 45 EACH | Refills: 0 | Status: SHIPPED | OUTPATIENT
Start: 2024-05-06 | End: 2024-06-04

## 2024-05-06 NOTE — TELEPHONE ENCOUNTER
Noam Pirere doesn't have any 12mg films in stock.  New RX for 8mg films - same dosing.  Please review and sign if appropriate.

## 2024-06-04 DIAGNOSIS — F11.90 OPIOID USE DISORDER: ICD-10-CM

## 2024-06-04 RX ORDER — BUPRENORPHINE AND NALOXONE 8; 2 MG/1; MG/1
1.5 FILM, SOLUBLE BUCCAL; SUBLINGUAL DAILY
Qty: 45 EACH | Refills: 0 | Status: SHIPPED | OUTPATIENT
Start: 2024-06-04 | End: 2024-07-09

## 2024-06-04 NOTE — TELEPHONE ENCOUNTER
buprenorphine HCl-naloxone HCl (SUBOXONE) 8-2 MG per film        Last Written Prescription Date:  5-6-24  Last Fill Quantity: 45,   # refills: 0  Last Office Visit: 10-4-23  Future Office visit:       Routing refill request to provider for review/approval because:  Drug not on the [facility], [facility] or [facility] refill protocol or controlled substance

## 2024-06-04 NOTE — TELEPHONE ENCOUNTER
important  Maximum MME cannot be calculated for this prescription. Enter discrete sig details to calculate maximum MME.

## 2024-07-06 DIAGNOSIS — F11.90 OPIOID USE DISORDER: ICD-10-CM

## 2024-07-09 RX ORDER — BUPRENORPHINE AND NALOXONE 8; 2 MG/1; MG/1
1.5 FILM, SOLUBLE BUCCAL; SUBLINGUAL DAILY
Qty: 45 EACH | Refills: 0 | Status: SHIPPED | OUTPATIENT
Start: 2024-07-09 | End: 2024-08-22

## 2024-07-09 NOTE — TELEPHONE ENCOUNTER
Suboxone      Last Written Prescription Date:  6.8.24  Last Fill Quantity: #45,   # refills: 0  Last Office Visit: 10.4.23  Future Office visit:       Routing refill request to provider for review/approval because:  Drug not on the G, P or [facility] refill protocol or controlled substance

## 2024-07-10 ENCOUNTER — PATIENT OUTREACH (OUTPATIENT)
Dept: CARE COORDINATION | Facility: OTHER | Age: 34
End: 2024-07-10

## 2024-07-10 ASSESSMENT — ACTIVITIES OF DAILY LIVING (ADL): DEPENDENT_IADLS:: INDEPENDENT

## 2024-07-10 NOTE — PROGRESS NOTES
Clinic Care Coordination Contact  Care Team Conversations    RN CC sent Nirav a text message this date checking in with him and to see if he has any updates on her insurance status.  Will wait for response.    Laura Peralta RN-BSN, [facility] Care Coordinator  [phone]

## 2024-08-22 DIAGNOSIS — F11.90 OPIOID USE DISORDER: ICD-10-CM

## 2024-08-22 RX ORDER — BUPRENORPHINE AND NALOXONE 8; 2 MG/1; MG/1
1.5 FILM, SOLUBLE BUCCAL; SUBLINGUAL DAILY
Qty: 45 EACH | Refills: 0 | Status: SHIPPED | OUTPATIENT
Start: 2024-08-22 | End: 2024-10-01

## 2024-08-22 NOTE — TELEPHONE ENCOUNTER
BUPREN/NALOXONE 8MG-2MG FILM       Last Written Prescription Date:  07/09/2024  Last Fill Quantity: 45,   # refills: 0  Last Office Visit: 10/04/2023  Future Office visit:       Routing refill request to provider for review/approval because:    Kimberly Boecker, RN

## 2024-09-25 ENCOUNTER — PATIENT OUTREACH (OUTPATIENT)
Dept: CARE COORDINATION | Facility: OTHER | Age: 34
End: 2024-09-25

## 2024-09-25 NOTE — PROGRESS NOTES
Clinic Care Coordination Contact  Care Team Conversations    JESUS STILL sent Nirav a text message this date checking in with him on his insurance status.  He has been without insurance for several months now - and always reports back that he is working on it.  RN CC informed him that Charlotte Chaudhary will be retiring in March 2025, so we need to see him in clinic to discuss a plan going forward.  Did inform him that there are 5 other providers at our clinic that prescribe MOUD and that RN CC would be more than happy to set him up with a new provider.  Will wait for a response.    Laura Peralta RN-BSN, [facility] Care Coordinator  [phone]

## 2024-10-01 DIAGNOSIS — F11.90 OPIOID USE DISORDER: ICD-10-CM

## 2024-10-01 RX ORDER — BUPRENORPHINE AND NALOXONE 8; 2 MG/1; MG/1
1.5 FILM, SOLUBLE BUCCAL; SUBLINGUAL DAILY
Qty: 45 EACH | Refills: 0 | Status: SHIPPED | OUTPATIENT
Start: 2024-10-01

## 2024-10-01 NOTE — TELEPHONE ENCOUNTER
Suboxone - informed Nirav that we need to see him prior to his next refill.  This will be last fill without being seen      Last Written Prescription Date:  8.22.24  Last Fill Quantity: #45,   # refills: 0  Last Office Visit: 10.4.23  Future Office visit:       Routing refill request to provider for review/approval because:  Drug not on the FM, P or [facility] refill protocol or controlled substance

## 2024-10-25 ENCOUNTER — PATIENT OUTREACH (OUTPATIENT)
Dept: CARE COORDINATION | Facility: OTHER | Age: 34
End: 2024-10-25

## 2024-10-25 NOTE — PROGRESS NOTES
Clinic Care Coordination Contact  Care Team Conversations    RN CC sent Nirav a text message this date checking in with him - informing him that he needs to schedule an appointment before his Suboxone can be refilled.  RN CC will wait to hear from him.    Laura Peralta RN-BSN, [facility] Care Coordinator  [phone]

## 2024-11-14 DIAGNOSIS — F11.90 OPIOID USE DISORDER: ICD-10-CM

## 2024-11-14 RX ORDER — BUPRENORPHINE AND NALOXONE 8; 2 MG/1; MG/1
1.5 FILM, SOLUBLE BUCCAL; SUBLINGUAL DAILY
Qty: 45 EACH | Refills: 0 | Status: SHIPPED | OUTPATIENT
Start: 2024-11-14

## 2024-11-14 RX ORDER — KETOCONAZOLE 20 MG/ML
SHAMPOO, SUSPENSION TOPICAL DAILY PRN
Qty: 240 ML | Refills: 1 | Status: SHIPPED | OUTPATIENT
Start: 2024-11-14

## 2024-11-14 NOTE — TELEPHONE ENCOUNTER
Suboxone     - appt scheduled for Nirav- Friday, January 10, 2025, arrive at 12:45PM.  Informed him that he needs to attend this appointment in order for his meds to continue to be refilled, as Charlotte is retiring.  Stated understanding.    Last Written Prescription Date:  10.12.24  Last Fill Quantity: #45,   # refills: 0  Last Office Visit: 10.4.23  Future Office visit:       Routing refill request to provider for review/approval because:  Drug not on the [facility], P or [facility] refill protocol or controlled substance

## 2025-01-02 DIAGNOSIS — F11.90 OPIOID USE DISORDER: ICD-10-CM

## 2025-01-02 RX ORDER — BUPRENORPHINE AND NALOXONE 8; 2 MG/1; MG/1
1.5 FILM, SOLUBLE BUCCAL; SUBLINGUAL DAILY
Qty: 45 FILM | Refills: 0 | Status: SHIPPED | OUTPATIENT
Start: 2025-01-02

## 2025-01-02 NOTE — TELEPHONE ENCOUNTER
BUPREN/NALOXONE 8MG-2MG FILM       Last Written Prescription Date:  11/14/2024  Last Fill Quantity: 45,   # refills: 0  Last Office Visit: 10/04/2024  Future Office visit:       Routing refill request to provider for review/approval because:      Kimberly Boecker, RN

## 2025-01-09 ENCOUNTER — PATIENT OUTREACH (OUTPATIENT)
Dept: CARE COORDINATION | Facility: OTHER | Age: 35
End: 2025-01-09

## 2025-01-09 NOTE — PROGRESS NOTES
Clinic Care Coordination Contact  Care Team Conversations    RN CC sent Nirav a text message this date reminding him of his appointment tomorrow and to arrive at 12:45PM.    Laura Peralta RN-BSN, [facility] Care Coordinator  [phone]

## 2025-01-09 NOTE — PROGRESS NOTES
"  Assessment & Plan     Opioid use disorder  He is not using. Discussion on use of tablets instead of films due to cost. Pretty significant changes. He owns a home  and very reliable and showing up for work.   - Urine Drug Screen Buprenorphine Urine Qualitative [id number], Ethanol Urine Qualitative ACU736, Methadone Urine Qualitative CYG7793, Oxycodone Urine Qualitative ONG1468, Creatinine Urine Random ENZ561          BMI  Estimated body mass index is 29.23 kg/m  as calculated from the following:    Height as of this encounter: 1.66 m (5' 5.35\").    Weight as of this encounter: 80.6 kg (177 lb 9.6 oz).   Weight management plan: Discussed healthy diet and exercise guidelines      See Patient Instructions    No follow-ups on file.    Juno Grullon is a 34 year old, presenting for the following health issues:  Follow Up    HPI           He is currently taking 12/3 mg of buprenorphine daily.   Last fill 1.3.25 #45.    Status Since Last Visit:  Have you used any opioids since your last visit?: no use since last visit  Do you feel that your dose of suboxone is too high or too low? Adequate  Have there been cravings for opioids? No   Any withdrawal symptoms?  None      Any side effects from the medication?  no  Any alcohol use? no  Any other recreational drug use? no    Precipitating Factors:  Triggers have been: non-existent   Other Supports:  Do you attend counseling or meet with a therapist? No  Do you attend NA or AA meetings? No  Do you have/meet with a sponsor? 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    " "  Objective    /66 (BP Location: Left arm, Patient Position: Sitting, Cuff Size: Adult Regular)   Pulse 86   Temp 98.9  F (37.2  C) (Tympanic)   Resp 16   Ht 1.66 m (5' 5.35\")   Wt 80.6 kg (177 lb 9.6 oz)   SpO2 95%   BMI 29.23 kg/m    Body mass index is 29.23 kg/m .  Physical Exam   GENERAL: alert and no distress  EYES: Eyes grossly normal to inspection, PERRL and conjunctivae and sclerae normal  HENT: ear canals and TM's normal, nose and mouth without ulcers or lesions  NECK: no adenopathy, no asymmetry, masses, or scars  RESP: lungs clear to auscultation - no rales, rhonchi or wheezes  CV: regular rate and rhythm, normal S1 S2, no S3 or S4, no murmur, click or rub, no peripheral edema  ABDOMEN: soft, nontender, no hepatosplenomegaly, no masses and bowel sounds normal  MS: no gross musculoskeletal defects noted, no edema  PSYCH: mentation appears normal, affect normal/bright    Results for orders placed or performed in visit on 01/10/25   Urine Drug Screen Panel     Status: Abnormal   Result Value Ref Range    Amphetamines Urine Screen Negative Screen Negative    Barbituates Urine Screen Negative Screen Negative    Benzodiazepine Urine Screen Negative Screen Negative    Cannabinoids Urine Screen Positive (A) Screen Negative    Cocaine Urine Screen Negative Screen Negative    Fentanyl Qual Urine Screen Negative Screen Negative    Opiates Urine Screen Negative Screen Negative    PCP Urine Screen Negative Screen Negative   Buprenorphine Urine, Qualitative     Status: Abnormal   Result Value Ref Range    Buprenorphine Qual Urine Screen Positive (A) Screen Negative   Ethanol urine     Status: Normal   Result Value Ref Range    Ethanol Urine Screen Negative Screen Negative   Methadone Qual Urine     Status: Normal   Result Value Ref Range    Methadone Urine Screen Negative Screen Negative   Oxycodone Urine, Qualitative     Status: Normal   Result Value Ref Range    Oxycodone Urine Screen Negative Screen " Negative   Creatinine random urine     Status: None   Result Value Ref Range    Creatinine Urine mg/dL 176.1 mg/dL   Urine Drug Screen Buprenorphine Urine Qualitative FKH1215, Ethanol Urine Qualitative JGU143, Methadone Urine Qualitative MAA0802, Oxycodone Urine Qualitative CCC9055, Creatinine Urine Random YDL077     Status: Abnormal    Narrative    The following orders were created for panel order Urine Drug Screen Buprenorphine Urine Qualitative [id number], Ethanol Urine Qualitative LYN272, Methadone Urine Qualitative [id number], Oxycodone Urine Qualitative NSS6274, Creatinine Urine Random UDN789.  Procedure                               Abnormality         Status                     ---------                               -----------         ------                     Urine Drug Screen Panel[[id number]]      Abnormal            Final result               Buprenorphine Urine, Jean...[[id number]]  Abnormal            Final result               Ethanol urine[[id number]]                Normal              Final result               Methadone Qual Urine[[id number]]         Normal              Final result               Oxycodone Urine, Qualita...[[id number]]  Normal              Final result               Creatinine random urine[[id number]]                          Final result                 Please view results for these tests on the individual orders.           Signed Electronically by: ZOHAIB Baer

## 2025-01-10 ENCOUNTER — OFFICE VISIT (OUTPATIENT)
Dept: FAMILY MEDICINE | Facility: OTHER | Age: 35
End: 2025-01-10
Attending: PHYSICIAN ASSISTANT

## 2025-01-10 ENCOUNTER — APPOINTMENT (OUTPATIENT)
Dept: LAB | Facility: OTHER | Age: 35
End: 2025-01-10
Attending: PHYSICIAN ASSISTANT

## 2025-01-10 VITALS
HEART RATE: 86 BPM | HEIGHT: 65 IN | OXYGEN SATURATION: 95 % | WEIGHT: 177.6 LBS | SYSTOLIC BLOOD PRESSURE: 118 MMHG | RESPIRATION RATE: 16 BRPM | DIASTOLIC BLOOD PRESSURE: 66 MMHG | BODY MASS INDEX: 29.59 KG/M2 | TEMPERATURE: 98.9 F

## 2025-01-10 DIAGNOSIS — F11.90 OPIOID USE DISORDER: Primary | ICD-10-CM

## 2025-01-10 LAB
AMPHETAMINES UR QL SCN: ABNORMAL
BARBITURATES UR QL SCN: ABNORMAL
BENZODIAZ UR QL SCN: ABNORMAL
BUPRENORPHINE UR QL: ABNORMAL
BZE UR QL SCN: ABNORMAL
CANNABINOIDS UR QL SCN: ABNORMAL
CREAT UR-MCNC: 176.1 MG/DL
ETHANOL UR QL SCN: NORMAL
FENTANYL UR QL: ABNORMAL
METHADONE UR QL SCN: NORMAL
OPIATES UR QL SCN: ABNORMAL
OXYCODONE UR QL: NORMAL
PCP QUAL URINE (ROCHE): ABNORMAL

## 2025-01-10 PROCEDURE — 80307 DRUG TEST PRSMV CHEM ANLYZR: CPT | Performed by: PHYSICIAN ASSISTANT

## 2025-01-10 PROCEDURE — 82570 ASSAY OF URINE CREATININE: CPT | Performed by: PHYSICIAN ASSISTANT

## 2025-01-10 PROCEDURE — 99213 OFFICE O/P EST LOW 20 MIN: CPT | Performed by: PHYSICIAN ASSISTANT

## 2025-01-10 RX ORDER — BUPRENORPHINE HYDROCHLORIDE AND NALOXONE HYDROCHLORIDE DIHYDRATE 8; 2 MG/1; MG/1
1 TABLET SUBLINGUAL 2 TIMES DAILY
Qty: 60 TABLET | Refills: 1 | Status: SHIPPED | OUTPATIENT
Start: 2025-01-10

## 2025-01-10 ASSESSMENT — PAIN SCALES - GENERAL: PAINLEVEL_OUTOF10: NO PAIN (0)

## 2025-03-28 NOTE — PROGRESS NOTES
Assessment & Plan     Encounter to establish care  - Previously followed with Charlotte Chaudhary  - Medical histories, medications reviewed and updated  - Limited by lack of insurance coverage  - Overdue for annual physical, hopefully will complete later this year when able to get insurance    Opioid use disorder  In sustained remission on MAT, has been in recovery for almost 6 years.  Very stable.  Current regimen effective and without side effect.  Will continue current regimen, 3-month visit intervals.  - Visit in conjunction with RN CC; appreciate assistance  - MAT contract signed 4/4/25  - PDMP reviewed  - Urine Drug Screen Buprenorphine Urine Qualitative [id number], Ethanol Urine Qualitative HFY368, Methadone Urine Qualitative [id number], Oxycodone Urine Qualitative [id number], Creatinine Urine Random HIU014  - buprenorphine-naloxone (SUBOXONE) 8-2 MG SUBL sublingual tablet; Place 1 tablet under the tongue 2 times daily.  - Follow-up 6/27/2025    Tinea capitis  Mild.  As needed Nizoral shampoo.  No concerns today.  - ketoconazole (NIZORAL) 2 % external shampoo; Apply topically daily as needed for itching or irritation.    I spent a total of 32 minutes on the day of the visit.   Time spent by me today doing chart review, history and exam, documentation and further activities per the note    The longitudinal plan of care for the diagnosis(es)/condition(s) as documented were addressed during this visit. Due to the added complexity in care, I will continue to support Mitchel Mcgovern in the subsequent management and with ongoing continuity of care.    Follow-up 3 months.    Subjective   Mitchel is a 34 year old, presenting for the following health issues:  Recheck Medication (Suboxone) and Establish Care        4/4/2025     1:39 PM   Additional Questions   Roomed by Cy Lombardo LPN   Accompanied by Self         4/4/2025     1:39 PM   Patient Reported Additional Medications   Patient reports taking the following new  medications None     History of Present Illness       Reason for visit:  Check up    He eats 0-1 servings of fruits and vegetables daily.He consumes 1 sweetened beverage(s) daily.He exercises with enough effort to increase his heart rate 60 or more minutes per day.  He exercises with enough effort to increase his heart rate 4 days per week.   He is taking medications regularly.        Establish care  -Previously followed with Charlotte Chaudhary  -Here for regular Suboxone check in  -No other acute concerns  -Due for annual physical  -Has had limited care or preventative medicine due to lack of insurance  -Working on insurance coverage    He is currently taking 8/2 mg of buprenorphine 2 times daily.   Last fill 3.10.25 #60.    Status Since Last Visit:  Have you used any opioids since your last visit?: no use since last visit  Do you feel that your dose of suboxone is too high or too low? Adequate  Have there been cravings for opioids? No   Any withdrawal symptoms?  None     Any side effects from the medication?  None  Any alcohol use? None  Any other recreational drug use? THC    Precipitating Factors:  Triggers have been: non-existent   Other Supports:  Do you attend counseling or meet with a therapist? No  Do you attend NA or AA meetings? Participates in other recovery activities -such as Mu-ism  Do you have/meet with a sponsor? No  Family and support systems have been: Helpful  What other goals have you been working on (job, family, relationships, etc)?         3/6/2023     4:00 PM 4/4/2025     1:47 PM   PHQ-9 SCORE   PHQ-9 Total Score MyChart  5 (Mild depression)   PHQ-9 Total Score 10 5        Patient-reported           3/6/2023     4:00 PM 4/4/2025     1:49 PM   AYSE-7 SCORE   Total Score  0 (minimal anxiety)   Total Score 5 0        Patient-reported     PDMP Review       None            Review of Systems  Constitutional, HEENT, cardiovascular, pulmonary, gi and gu systems are negative, except as otherwise noted.     "  Objective    /74   Pulse 76   Temp 98.2  F (36.8  C) (Tympanic)   Resp 16   Ht 1.66 m (5' 5.35\")   Wt 80.7 kg (177 lb 14.4 oz)   SpO2 98%   BMI 29.29 kg/m    Body mass index is 29.29 kg/m .  Physical Exam  Vitals reviewed.   Constitutional:       Appearance: Normal appearance.   HENT:      Head: Normocephalic and atraumatic.   Cardiovascular:      Rate and Rhythm: Normal rate and regular rhythm.      Heart sounds: No murmur heard.  Pulmonary:      Effort: Pulmonary effort is normal.      Breath sounds: Normal breath sounds. No stridor. No wheezing, rhonchi or rales.   Musculoskeletal:         General: Normal range of motion.   Skin:     General: Skin is warm and dry.   Neurological:      General: No focal deficit present.      Mental Status: He is alert and oriented to person, place, and time.   Psychiatric:         Mood and Affect: Mood normal.         Behavior: Behavior normal.          Results for orders placed or performed in visit on 04/04/25 (from the past 24 hours)   Urine Drug Screen Buprenorphine Urine Qualitative [id number], Ethanol Urine Qualitative LEW612, Methadone Urine Qualitative HGP9956, Oxycodone Urine Qualitative [id number], Creatinine Urine Random QGB897    Narrative    The following orders were created for panel order Urine Drug Screen Buprenorphine Urine Qualitative [id number], Ethanol Urine Qualitative PLM011, Methadone Urine Qualitative OZC4700, Oxycodone Urine Qualitative MLY4642, Creatinine Urine Random RAB159.  Procedure                               Abnormality         Status                     ---------                               -----------         ------                     Urine Drug Screen Panel[[id number]]                         In process                 Buprenorphine Urine, Qu...[[id number]]                      In process                 Ethanol urine[[id number]]                                   In process                 Methadone Qual Urine[[id number]]               "              In process                 Oxycodone Urine, Qualit...[[id number]]                      In process                 Creatinine random urine[[id number]]                         In process                   Please view results for these tests on the individual orders.           Signed Electronically by: Hugo Sutherland MD

## 2025-04-03 ENCOUNTER — PATIENT OUTREACH (OUTPATIENT)
Dept: CARE COORDINATION | Facility: OTHER | Age: 35
End: 2025-04-03

## 2025-04-03 PROBLEM — F11.21: Status: ACTIVE | Noted: 2018-03-21

## 2025-04-03 PROBLEM — Z87.891 FORMER SMOKER: Status: ACTIVE | Noted: 2018-03-21

## 2025-04-03 PROBLEM — H52.7 AMETROPIA: Status: ACTIVE | Noted: 2018-05-17

## 2025-04-03 PROBLEM — H53.143 VISUAL DISCOMFORT OF BOTH EYES: Status: ACTIVE | Noted: 2018-05-17

## 2025-04-03 PROBLEM — G43.001 MIGRAINE WITHOUT AURA AND WITH STATUS MIGRAINOSUS, NOT INTRACTABLE: Status: ACTIVE | Noted: 2018-05-17

## 2025-04-03 NOTE — PROGRESS NOTES
Clinic Care Coordination Contact  Care Team Conversations    RN CC sent Nirav a text message this date reminding him of his appointment tomorrow and to arrive at 1:45PM.    Laura Peralta RN-BSN, [facility] Care Coordinator  [phone]

## 2025-04-04 ENCOUNTER — OFFICE VISIT (OUTPATIENT)
Dept: FAMILY MEDICINE | Facility: OTHER | Age: 35
End: 2025-04-04
Attending: STUDENT IN AN ORGANIZED HEALTH CARE EDUCATION/TRAINING PROGRAM

## 2025-04-04 ENCOUNTER — APPOINTMENT (OUTPATIENT)
Dept: LAB | Facility: OTHER | Age: 35
End: 2025-04-04

## 2025-04-04 VITALS
HEART RATE: 76 BPM | SYSTOLIC BLOOD PRESSURE: 116 MMHG | RESPIRATION RATE: 16 BRPM | HEIGHT: 65 IN | TEMPERATURE: 98.2 F | OXYGEN SATURATION: 98 % | DIASTOLIC BLOOD PRESSURE: 74 MMHG | WEIGHT: 177.9 LBS | BODY MASS INDEX: 29.64 KG/M2

## 2025-04-04 DIAGNOSIS — Z76.89 ENCOUNTER TO ESTABLISH CARE: Primary | ICD-10-CM

## 2025-04-04 DIAGNOSIS — B35.0 TINEA CAPITIS: ICD-10-CM

## 2025-04-04 DIAGNOSIS — F11.90 OPIOID USE DISORDER: ICD-10-CM

## 2025-04-04 PROBLEM — F11.21: Status: RESOLVED | Noted: 2018-03-21 | Resolved: 2025-04-04

## 2025-04-04 PROBLEM — G43.001 MIGRAINE WITHOUT AURA AND WITH STATUS MIGRAINOSUS, NOT INTRACTABLE: Status: RESOLVED | Noted: 2018-05-17 | Resolved: 2025-04-04

## 2025-04-04 PROBLEM — H53.143 VISUAL DISCOMFORT OF BOTH EYES: Status: RESOLVED | Noted: 2018-05-17 | Resolved: 2025-04-04

## 2025-04-04 LAB
AMPHETAMINES UR QL SCN: ABNORMAL
BARBITURATES UR QL SCN: ABNORMAL
BENZODIAZ UR QL SCN: ABNORMAL
BUPRENORPHINE UR QL: ABNORMAL
BZE UR QL SCN: ABNORMAL
CANNABINOIDS UR QL SCN: ABNORMAL
CREAT UR-MCNC: 209.2 MG/DL
ETHANOL UR QL SCN: NORMAL
FENTANYL UR QL: ABNORMAL
METHADONE UR QL SCN: NORMAL
OPIATES UR QL SCN: ABNORMAL
OXYCODONE UR QL: NORMAL
PCP QUAL URINE (ROCHE): ABNORMAL

## 2025-04-04 RX ORDER — BUPRENORPHINE HYDROCHLORIDE AND NALOXONE HYDROCHLORIDE DIHYDRATE 8; 2 MG/1; MG/1
1 TABLET SUBLINGUAL 2 TIMES DAILY
Qty: 60 TABLET | Refills: 2 | Status: SHIPPED | OUTPATIENT
Start: 2025-04-04

## 2025-04-04 RX ORDER — KETOCONAZOLE 20 MG/ML
SHAMPOO, SUSPENSION TOPICAL DAILY PRN
Qty: 240 ML | Refills: 1 | Status: SHIPPED | OUTPATIENT
Start: 2025-04-04

## 2025-04-04 ASSESSMENT — PATIENT HEALTH QUESTIONNAIRE - PHQ9
SUM OF ALL RESPONSES TO PHQ QUESTIONS 1-9: 5
10. IF YOU CHECKED OFF ANY PROBLEMS, HOW DIFFICULT HAVE THESE PROBLEMS MADE IT FOR YOU TO DO YOUR WORK, TAKE CARE OF THINGS AT HOME, OR GET ALONG WITH OTHER PEOPLE: NOT DIFFICULT AT ALL
SUM OF ALL RESPONSES TO PHQ QUESTIONS 1-9: 5

## 2025-04-04 ASSESSMENT — ANXIETY QUESTIONNAIRES
7. FEELING AFRAID AS IF SOMETHING AWFUL MIGHT HAPPEN: NOT AT ALL
GAD7 TOTAL SCORE: 0
3. WORRYING TOO MUCH ABOUT DIFFERENT THINGS: NOT AT ALL
4. TROUBLE RELAXING: NOT AT ALL
2. NOT BEING ABLE TO STOP OR CONTROL WORRYING: NOT AT ALL
GAD7 TOTAL SCORE: 0
5. BEING SO RESTLESS THAT IT IS HARD TO SIT STILL: NOT AT ALL
IF YOU CHECKED OFF ANY PROBLEMS ON THIS QUESTIONNAIRE, HOW DIFFICULT HAVE THESE PROBLEMS MADE IT FOR YOU TO DO YOUR WORK, TAKE CARE OF THINGS AT HOME, OR GET ALONG WITH OTHER PEOPLE: NOT DIFFICULT AT ALL
8. IF YOU CHECKED OFF ANY PROBLEMS, HOW DIFFICULT HAVE THESE MADE IT FOR YOU TO DO YOUR WORK, TAKE CARE OF THINGS AT HOME, OR GET ALONG WITH OTHER PEOPLE?: NOT DIFFICULT AT ALL
6. BECOMING EASILY ANNOYED OR IRRITABLE: NOT AT ALL
7. FEELING AFRAID AS IF SOMETHING AWFUL MIGHT HAPPEN: NOT AT ALL
GAD7 TOTAL SCORE: 0
1. FEELING NERVOUS, ANXIOUS, OR ON EDGE: NOT AT ALL

## 2025-04-04 ASSESSMENT — PAIN SCALES - GENERAL: PAINLEVEL_OUTOF10: NO PAIN (0)

## 2025-04-04 NOTE — LETTER
Mitchelsulaiman Mcgovern  [id number]         April 4, 2025    Suboxone (Buprenorphine/Naloxone) Treatment Agreement    This is an agreement between you and your provider about the safe and appropriate use of Suboxone (Buprenorphine/Naloxone) prescribed by your care team.    We are committed to collaborating with you in your efforts to get better. To support you in this work, we will help you schedule regular office appointments for medicine refills. If we must cancel or change your appointment for any reason, we will make sure you have enough medicine to last until your next appointment.     As a Provider, I will:  Listen carefully to your concerns and treat you with respect.   Recommend a treatment plan that I believe is in your best interest. This plan may involve therapies other than Suboxone (Buprenorphine/Naloxone).   Talk with you often about the possible benefits, and the risk of harm of any medicine that we prescribe for you.   Provide a plan on how to taper (discontinue or go off) using this medicine if the decision is made to stop its use.    As a Patient, I understand that Suboxone (Buprenorphine/Naloxone):   Is a controlled substance prescribed by my care team to help me function or work and manage my condition(s).   Needs to be taken exactly as prescribed. Combining Suboxone (Buprenorphine/Naloxone) with certain medicines or chemicals (such as illegal drugs, sedatives, sleeping pills, and benzodiazepines) can be dangerous or even fatal. If I stop Suboxone (Buprenorphine/Naloxone) suddenly, I may have severe withdrawal symptoms.    The risks, benefits and side effects of these medicine(s) were explained to me. I agree that:  I will take part in other treatments as advised by my care team. This may be psychiatry or counseling, physical therapy, behavioral therapy, group treatment or a referral to a specialist.     I will keep all my appointments. I understand that this is part of the monitoring of Suboxone  (Buprenorphine/Naloxone). My care team may require an office visit for EVERY refill. If I miss appointments or do not follow instructions, my care team may stop my medicine.    I will be respectful and considerate to all staff and providers at the clinic. Rude or aggressive behavior to staff including providers, nursing staff, , and any others will not be tolerated.    I will be honest with my care team.     I will take my medicines as prescribed. I will not change the dose or schedule unless my care team tells me to. There will be no refills if I run out early.     I may be asked to come to the clinic and complete a urine drug test or complete a film/pill count at any time. If I do not give a urine sample or participate in a film/pill count, my care team may stop my medicine.    It is up to me to make sure that I do not run out of my medicines on weekends or holidays. If my care team is willing to refill my Suboxone (Buprenorphine/Naloxone) prescription without a visit, I must request refills only during office hours. Refills may take up to three business days to process. I will use one pharmacy to fill all my Suboxone (Buprenorphine/Naloxone) and other controlled substance prescriptions. I will notify the clinic about any changes to my insurance or medication availability.    I am responsible for my prescriptions. If the medicine/prescription is lost, stolen, or destroyed, it will not be replaced. I will store my medication in a secure location away from children. I also agree not to share controlled substance medicines with anyone.    I am aware I should not use any illegal or recreational drugs. I agree not to drink alcohol unless my care team says I can.     I will tell my care team right away if I become pregnant, have a new medical problem treated outside of my regular clinic, or have a change in my medications.    I understand that this medicine can affect my thinking, judgment, and reaction time.  Alcohol and drugs affect the brain and body, which can affect the safety of my driving. Being under the influence of alcohol or drugs can affect my decision-making, behaviors, personal safety, and the safety of others. Driving while impaired (DWI) can occur if a person is driving, operating, or in physical control of a car, motorcycle, boat, snowmobile, ATV, motorbike, off-road vehicle, or any other motor vehicle ([place] Statute 169[place].20). I understand the risk if I choose to drive or operate any vehicle or machinery.    I understand that if I do not follow any of the conditions above, my prescriptions or treatment may be stopped or changed.    I agree that my provider, clinic care team, and pharmacy may work with any city, state or federal law enforcement agency that investigates the misuse, sale, or other diversion of my controlled medicine. I will allow my provider to discuss my care with, or share a copy of, this agreement with any other treating provider, pharmacy, or emergency room where I receive care.    This agreement will remain in effect for duration of therapy and updated as appropriate, and/or  annually.    I have read this agreement and have asked questions about anything I did not understand.    __________________________________            ____________________________________  Patient     Date          Hugo Sutherland MD                     Date          Electronically signed

## 2025-06-26 ENCOUNTER — PATIENT OUTREACH (OUTPATIENT)
Dept: CARE COORDINATION | Facility: OTHER | Age: 35
End: 2025-06-26

## 2025-06-26 NOTE — PROGRESS NOTES
Clinic Care Coordination Contact  Care Team Conversations    RN CC sent Nirav a text message this date reminding him of his appointment tomorrow and to arrive at 1:15PM.    Laura Peralta RN-BSN, [facility] Care Coordinator  [phone]

## 2025-07-08 ENCOUNTER — PATIENT OUTREACH (OUTPATIENT)
Dept: CARE COORDINATION | Facility: OTHER | Age: 35
End: 2025-07-08

## 2025-07-09 NOTE — PROGRESS NOTES
Clinic Care Coordination Contact  Care Team Conversations      Nirav was a no show for his MOUD follow up appointment with Dr. Sutherland on 7/8/2025.  RN CC sent him a message informing him of this.  Will wait for response to get him rescheduled.    Laura Peralta RN-BSN, [facility] Care Coordinator  [phone]

## 2025-08-07 ENCOUNTER — PATIENT OUTREACH (OUTPATIENT)
Dept: CARE COORDINATION | Facility: OTHER | Age: 35
End: 2025-08-07

## 2025-08-08 ENCOUNTER — APPOINTMENT (OUTPATIENT)
Dept: LAB | Facility: OTHER | Age: 35
End: 2025-08-08
Attending: STUDENT IN AN ORGANIZED HEALTH CARE EDUCATION/TRAINING PROGRAM

## 2025-08-08 ENCOUNTER — OFFICE VISIT (OUTPATIENT)
Dept: FAMILY MEDICINE | Facility: OTHER | Age: 35
End: 2025-08-08
Attending: STUDENT IN AN ORGANIZED HEALTH CARE EDUCATION/TRAINING PROGRAM

## 2025-08-08 VITALS
OXYGEN SATURATION: 97 % | RESPIRATION RATE: 16 BRPM | HEART RATE: 74 BPM | TEMPERATURE: 98.2 F | WEIGHT: 178.3 LBS | HEIGHT: 65 IN | DIASTOLIC BLOOD PRESSURE: 70 MMHG | SYSTOLIC BLOOD PRESSURE: 118 MMHG | BODY MASS INDEX: 29.71 KG/M2

## 2025-08-08 DIAGNOSIS — F11.90 OPIOID USE DISORDER: Primary | ICD-10-CM

## 2025-08-08 LAB
AMPHETAMINES UR QL SCN: ABNORMAL
BARBITURATES UR QL SCN: ABNORMAL
BENZODIAZ UR QL SCN: ABNORMAL
BUPRENORPHINE UR QL: ABNORMAL
BZE UR QL SCN: ABNORMAL
CANNABINOIDS UR QL SCN: ABNORMAL
CREAT UR-MCNC: 111.6 MG/DL
ETHANOL UR QL SCN: NORMAL
FENTANYL UR QL: ABNORMAL
METHADONE UR QL SCN: NORMAL
OPIATES UR QL SCN: ABNORMAL
OXYCODONE UR QL: NORMAL
PCP QUAL URINE (ROCHE): ABNORMAL

## 2025-08-08 RX ORDER — BUPRENORPHINE HYDROCHLORIDE AND NALOXONE HYDROCHLORIDE DIHYDRATE 8; 2 MG/1; MG/1
1 TABLET SUBLINGUAL 2 TIMES DAILY
Qty: 60 TABLET | Refills: 1 | Status: SHIPPED | OUTPATIENT
Start: 2025-08-29

## 2025-08-08 ASSESSMENT — PAIN SCALES - GENERAL: PAINLEVEL_OUTOF10: NO PAIN (0)
